# Patient Record
Sex: FEMALE | URBAN - METROPOLITAN AREA
[De-identification: names, ages, dates, MRNs, and addresses within clinical notes are randomized per-mention and may not be internally consistent; named-entity substitution may affect disease eponyms.]

---

## 2021-05-28 ENCOUNTER — APPOINTMENT (RX ONLY)
Dept: URBAN - METROPOLITAN AREA CLINIC 157 | Facility: CLINIC | Age: 26
Setting detail: DERMATOLOGY
End: 2021-05-28

## 2021-05-28 VITALS — DIASTOLIC BLOOD PRESSURE: 70 MMHG | SYSTOLIC BLOOD PRESSURE: 108 MMHG | HEIGHT: 72 IN | WEIGHT: 220 LBS

## 2021-05-28 DIAGNOSIS — L21.8 OTHER SEBORRHEIC DERMATITIS: ICD-10-CM

## 2021-05-28 PROCEDURE — ? PRESCRIPTION

## 2021-05-28 PROCEDURE — 99203 OFFICE O/P NEW LOW 30 MIN: CPT

## 2021-05-28 PROCEDURE — ? TREATMENT REGIMEN

## 2021-05-28 PROCEDURE — ? COUNSELING

## 2021-05-28 RX ORDER — HYDROCORTISONE 25 MG/G
OINTMENT TOPICAL
Qty: 1 | Refills: 1 | Status: ERX | COMMUNITY
Start: 2021-05-28

## 2021-05-28 RX ADMIN — HYDROCORTISONE: 25 OINTMENT TOPICAL at 00:00

## 2021-05-28 ASSESSMENT — SEVERITY ASSESSMENT: HOW SEVERE IS THIS PATIENT'S CONDITION?: MILD

## 2021-05-28 NOTE — PROCEDURE: TREATMENT REGIMEN
Samples Given: Free & Clear shampoo
Detail Level: Simple
Initiate Treatment: hydrocortisone 2.5 % topical ointment \\nDays Supply: 30\\nApply topically to affected areas BID for 2weeks then PRN for flares